# Patient Record
Sex: MALE
[De-identification: names, ages, dates, MRNs, and addresses within clinical notes are randomized per-mention and may not be internally consistent; named-entity substitution may affect disease eponyms.]

---

## 2023-04-12 ENCOUNTER — APPOINTMENT (OUTPATIENT)
Dept: UROLOGY | Facility: CLINIC | Age: 48
End: 2023-04-12
Payer: COMMERCIAL

## 2023-04-12 VITALS
TEMPERATURE: 97 F | HEART RATE: 106 BPM | HEIGHT: 70 IN | DIASTOLIC BLOOD PRESSURE: 85 MMHG | SYSTOLIC BLOOD PRESSURE: 116 MMHG | OXYGEN SATURATION: 98 % | WEIGHT: 173 LBS | BODY MASS INDEX: 24.77 KG/M2

## 2023-04-12 DIAGNOSIS — Z78.9 OTHER SPECIFIED HEALTH STATUS: ICD-10-CM

## 2023-04-12 DIAGNOSIS — Z80.3 FAMILY HISTORY OF MALIGNANT NEOPLASM OF BREAST: ICD-10-CM

## 2023-04-12 DIAGNOSIS — A63.0 ANOGENITAL (VENEREAL) WARTS: ICD-10-CM

## 2023-04-12 DIAGNOSIS — R79.89 OTHER SPECIFIED ABNORMAL FINDINGS OF BLOOD CHEMISTRY: ICD-10-CM

## 2023-04-12 DIAGNOSIS — N50.819 TESTICULAR PAIN, UNSPECIFIED: ICD-10-CM

## 2023-04-12 DIAGNOSIS — R74.8 ABNORMAL LEVELS OF OTHER SERUM ENZYMES: ICD-10-CM

## 2023-04-12 DIAGNOSIS — B37.2 CANDIDIASIS OF SKIN AND NAIL: ICD-10-CM

## 2023-04-12 PROBLEM — Z00.00 ENCOUNTER FOR PREVENTIVE HEALTH EXAMINATION: Status: ACTIVE | Noted: 2023-04-12

## 2023-04-12 PROCEDURE — 99204 OFFICE O/P NEW MOD 45 MIN: CPT

## 2023-04-12 NOTE — PHYSICAL EXAM
[Abdomen Soft] : soft [Abdomen Tenderness] : non-tender [] : no hepato-splenomegaly [Abdomen Mass (___ Cm)] : no abdominal mass palpated [Abdomen Hernia] : no hernia was discovered [Costovertebral Angle Tenderness] : no ~M costovertebral angle tenderness [Urethral Meatus] : meatus normal [Penis Abnormality] : normal circumcised penis [Epididymis] : the epididymides were normal [Testes Tenderness] : no tenderness of the testes [FreeTextEntry1] : scrotal erythema diffuse ; no lesions

## 2023-04-12 NOTE — ASSESSMENT
[FreeTextEntry1] : Mr. Dirk Holt presents today with symptoms which are difficult to characterize.  He states that he underwent cardiac ablation at [facility].  At that time he was shaved.  Subsequently developed a scrotal rash.  He was treated with nystatin by his urologist.  He had some improvement.  He presents today with continued erythema of the scrotal skin.  There is no lesions.  There is no masses.  There is no vesicles.  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  Based upon his examination we plan to treat him for candidal dermatitis with triamcinolone nystatin.\par \par He also complains of vague testicular discomfort "crossed veins".  There is no findings on examination.  The testicles themselves are nontender.\par \par Patient has a high degree of anxiety regarding medical illnesses.  He has history of cardiac surgery, cardiac ablation, and  elevated liver enzymes without a specific diagnosis. \par \par Because of these concerns we will proceed with scrotal ultrasonography at the next visit\par \par Plan:\par 1. urinalysis\par 2. Triamcinolone/nystatin\par 3. scrotal ultrasound\par 4. reassurance\par

## 2023-04-12 NOTE — HISTORY OF PRESENT ILLNESS
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\par presents today after\par s/p cardiac ablation at [facility]\par shaved at that time \par developed fungal rash \par seen by urologist treated for "fungal infection"\par improved but not resolved\par patient very anxious about significant pathology [Urinary Incontinence] : no urinary incontinence [Urinary Retention] : no urinary retention [Urinary Urgency] : no urinary urgency [Urinary Frequency] : no urinary frequency [Nocturia] : no nocturia [Weak Stream] : no weak stream [Intermittency] : no intermittency [Post-Void Dribbling] : no post-void dribbling [Erectile Dysfunction] : no Erectile Dysfunction

## 2023-04-13 LAB
APPEARANCE: CLEAR
BACTERIA: NEGATIVE
BILIRUBIN URINE: NEGATIVE
BLOOD URINE: NEGATIVE
COLOR: NORMAL
GLUCOSE QUALITATIVE U: NEGATIVE
HYALINE CASTS: 0 /LPF
KETONES URINE: NEGATIVE
LEUKOCYTE ESTERASE URINE: NEGATIVE
MICROSCOPIC-UA: NORMAL
NITRITE URINE: NEGATIVE
PH URINE: 6
PROTEIN URINE: NEGATIVE
RED BLOOD CELLS URINE: 1 /HPF
SPECIFIC GRAVITY URINE: 1.01
SQUAMOUS EPITHELIAL CELLS: 0 /HPF
UROBILINOGEN URINE: NORMAL
WHITE BLOOD CELLS URINE: 1 /HPF

## 2023-04-17 ENCOUNTER — RX RENEWAL (OUTPATIENT)
Age: 48
End: 2023-04-17

## 2023-04-17 RX ORDER — NYSTATIN AND TRIAMCINOLONE ACETONIDE 100000; 1 MG/G; MG/G
100000-0.1 CREAM TOPICAL TWICE DAILY
Qty: 15 | Refills: 0 | Status: ACTIVE | COMMUNITY
Start: 2023-04-12 | End: 1900-01-01

## 2023-05-04 ENCOUNTER — APPOINTMENT (OUTPATIENT)
Dept: UROLOGY | Facility: CLINIC | Age: 48
End: 2023-05-04
Payer: COMMERCIAL

## 2023-05-04 PROCEDURE — 99213 OFFICE O/P EST LOW 20 MIN: CPT

## 2023-05-04 PROCEDURE — 76870 US EXAM SCROTUM: CPT

## 2023-05-04 NOTE — PHYSICAL EXAM
[Urethral Meatus] : meatus normal [Penis Abnormality] : normal circumcised penis [Epididymis] : the epididymides were normal [Testes Tenderness] : no tenderness of the testes [FreeTextEntry1] : normal

## 2023-05-04 NOTE — ASSESSMENT
[FreeTextEntry1] : Mr. Dirk Holt presents today with symptoms which are difficult to characterize.  He states that he underwent cardiac ablation at [facility].  At that time he was shaved.  Subsequently developed a scrotal rash.  He was treated with nystatin by his urologist.  He had some improvement.  He presents today with continued erythema of the scrotal skin.  There is no lesions.  There is no masses.  There is no vesicles.  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  Based upon his examination we plan to treat him for candidal dermatitis with triamcinolone nystatin.\par \par He also complains of vague testicular discomfort "crossed veins".  There is no findings on examination.  The testicles themselves are nontender.\par \par Patient has a high degree of anxiety regarding medical illnesses.  He has history of cardiac surgery, cardiac ablation, and  elevated liver enzymes without a specific diagnosis. \par \par Because of these concerns we will proceed with scrotal ultrasonography at the next visit\par \par Plan:\par 1. urinalysis\par 2. Triamcinolone/nystatin\par 3. scrotal ultrasound\par 4. reassurance\par \par \par 5/4/2023\par Patient returns for scrotal ultrasonography as he was concerned with his testicular discomfort.  There were no findings on scrotal ultrasonography.  There is a small scrotal dayna.  There is a teeny epididymal cyst.  The testicles themselves are normal.\par He is not having further testicular discomfort\par \par \par The patient was reassured.\par \par A scrotal erythema responded to triamcinolone.\par \par His cardiac evaluation recently was normal.\par \par The patient was reassured and will return as needed\par \par

## 2023-06-16 ENCOUNTER — NON-APPOINTMENT (OUTPATIENT)
Age: 48
End: 2023-06-16

## 2023-06-20 ENCOUNTER — APPOINTMENT (OUTPATIENT)
Dept: UROLOGY | Facility: CLINIC | Age: 48
End: 2023-06-20
Payer: COMMERCIAL

## 2023-06-20 DIAGNOSIS — N48.9 DISORDER OF PENIS, UNSPECIFIED: ICD-10-CM

## 2023-06-20 PROCEDURE — 99213 OFFICE O/P EST LOW 20 MIN: CPT

## 2023-06-20 NOTE — ASSESSMENT
[FreeTextEntry1] : Mr. Dirk Holt presents today with symptoms which are difficult to characterize.  He states that he underwent cardiac ablation at [facility].  At that time he was shaved.  Subsequently developed a scrotal rash.  He was treated with nystatin by his urologist.  He had some improvement.  He presents today with continued erythema of the scrotal skin.  There is no lesions.  There is no masses.  There is no vesicles.  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  Based upon his examination we plan to treat him for candidal dermatitis with triamcinolone nystatin.\par \par He also complains of vague testicular discomfort "crossed veins".  There is no findings on examination.  The testicles themselves are nontender.\par \par Patient has a high degree of anxiety regarding medical illnesses.  He has history of cardiac surgery, cardiac ablation, and  elevated liver enzymes without a specific diagnosis. \par \par Because of these concerns we will proceed with scrotal ultrasonography at the next visit\par \par Plan:\par 1. urinalysis\par 2. Triamcinolone/nystatin\par 3. scrotal ultrasound\par 4. reassurance\par \par \par 5/4/2023\par Patient returns for scrotal ultrasonography as he was concerned with his testicular discomfort.  There were no findings on scrotal ultrasonography.  There is a small scrotal dayna.  There is a teeny epididymal cyst.  The testicles themselves are normal.\par He is not having further testicular discomfort\par \par \par The patient was reassured.\par \par A scrotal erythema responded to triamcinolone.\par \par His cardiac evaluation recently was normal.\par \par The patient was reassured and will return as needed\par \par 6/20/2023 patient returns.  \par He has had a degree of anxiety.  He is concerned regarding penile lesions.  On examination he has several red macular lesions.  They are not scaly.  They are not tender.  It is raised.  With his high degree of anxiety that the dermatologic consultation would be warranted. He has a dermatologist and will followup.\par \par The DIR[facility] HOLT  expressed fully understanding of the information provided, the consequences and the management.\par

## 2023-06-20 NOTE — PHYSICAL EXAM
[Urethral Meatus] : meatus normal [Penis Abnormality] : normal circumcised penis [Epididymis] : the epididymides were normal [Testes Tenderness] : no tenderness of the testes [FreeTextEntry1] : ventral aspect of penis (see images) red macular lesion; no scalling

## 2023-06-20 NOTE — HISTORY OF PRESENT ILLNESS
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